# Patient Record
(demographics unavailable — no encounter records)

---

## 2024-12-18 NOTE — PHYSICAL EXAM
[Left] : left foot and ankle [2nd] : 2nd [3rd] : 3rd [Right] : right foot and ankle [] : no ecchymosis [FreeTextEntry3] : 1+ pitting edema  [TWNoteComboBox7] : dorsiflexion 5 degrees

## 2024-12-18 NOTE — HISTORY OF PRESENT ILLNESS
[Gradual] : gradual [Localized] : localized [Meds] : meds [Walking] : walking [Retired] : Work status: retired [de-identified] : 12/18/24: Patient c/o pain to the pads of both of his feet for the past month. He also c/o pain into his heels. AM pain No N/t. No injury or trauma. He saw his PCP and was treated with nsaids. He does feel better after taking them, but he still has pain.  [] : no [FreeTextEntry1] : heel pain  [FreeTextEntry5] : heel spur in rt foot; pain level is almost 0  and poss left heel with a pain level of a 4 [de-identified] : getting up after sitting too long  [de-identified] : PCP

## 2025-01-29 NOTE — ASSESSMENT
[FreeTextEntry1] : Erectile dysfunction, BPH, incomplete bladder emptying   Pt voided prior to coming to appt.  I discussed UDS to assess bladder function. Pt declines. He does not want any surgical procedures for his prostate and declines ICI. Rx: Tadalafil 10 mg to be added on days when he will have activity no more than every 36H RTO 6 months

## 2025-01-29 NOTE — PHYSICAL EXAM
[General Appearance - Well Developed] : well developed [General Appearance - Well Nourished] : well nourished [Normal Appearance] : normal appearance [Well Groomed] : well groomed [General Appearance - In No Acute Distress] : no acute distress [Respiration, Rhythm And Depth] : normal respiratory rhythm and effort [Exaggerated Use Of Accessory Muscles For Inspiration] : no accessory muscle use [Abdomen Tenderness] : non-tender [Abdomen Soft] : soft [Normal Station and Gait] : the gait and station were normal for the patient's age [Skin Color & Pigmentation] : normal skin color and pigmentation [] : no rash [Oriented To Time, Place, And Person] : oriented to person, place, and time [Affect] : the affect was normal [Mood] : the mood was normal [Not Anxious] : not anxious

## 2025-01-29 NOTE — PHYSICAL EXAM
[General Appearance - Well Developed] : well developed [General Appearance - Well Nourished] : well nourished [Normal Appearance] : normal appearance [Well Groomed] : well groomed [General Appearance - In No Acute Distress] : no acute distress [Respiration, Rhythm And Depth] : normal respiratory rhythm and effort [Exaggerated Use Of Accessory Muscles For Inspiration] : no accessory muscle use [Abdomen Soft] : soft [Abdomen Tenderness] : non-tender [Normal Station and Gait] : the gait and station were normal for the patient's age [Skin Color & Pigmentation] : normal skin color and pigmentation [] : no rash [Affect] : the affect was normal [Oriented To Time, Place, And Person] : oriented to person, place, and time [Mood] : the mood was normal [Not Anxious] : not anxious

## 2025-01-29 NOTE — HISTORY OF PRESENT ILLNESS
[FreeTextEntry1] : States urinary symptoms are stable. He is on Tamsulosin, Finasteride and daily Tadalafil. With ED, using Tadalafil 5 mg. He feels erections could be better. HE states he only has frequency if he drinks a lot of fluids. Denies weak stream, urgency, nocturia.

## 2025-07-30 NOTE — HISTORY OF PRESENT ILLNESS
[FreeTextEntry1] : Pt with stable BPH, on Finasteride and Tamsulosin 1 cap. He has known incomplete bladder emptying but does not want further testing, cathter or CIC.  Denies frequency, urgency, weak stream. With ED, states Tadalafil 10 mg not effective.

## 2025-07-30 NOTE — ASSESSMENT
[FreeTextEntry1] : Erectile dysfunction, BPH, incomplete bladder emptying   Continue Tamsulosin 0.4 mg, Finasteride 5 mg Rx: Increase Tadalafil to 20 mg PRN RTO 6 months

## 2025-07-30 NOTE — PHYSICAL EXAM
[General Appearance - Well Developed] : well developed [General Appearance - Well Nourished] : well nourished [Normal Appearance] : normal appearance [Well Groomed] : well groomed [General Appearance - In No Acute Distress] : no acute distress [Respiration, Rhythm And Depth] : normal respiratory rhythm and effort [Exaggerated Use Of Accessory Muscles For Inspiration] : no accessory muscle use [Abdomen Soft] : soft [Abdomen Tenderness] : non-tender [Normal Station and Gait] : the gait and station were normal for the patient's age [Skin Color & Pigmentation] : normal skin color and pigmentation [] : no rash [Oriented To Time, Place, And Person] : oriented to person, place, and time [Affect] : the affect was normal [Mood] : the mood was normal [Not Anxious] : not anxious